# Patient Record
Sex: MALE | Race: WHITE | NOT HISPANIC OR LATINO | URBAN - METROPOLITAN AREA
[De-identification: names, ages, dates, MRNs, and addresses within clinical notes are randomized per-mention and may not be internally consistent; named-entity substitution may affect disease eponyms.]

---

## 2017-12-16 ENCOUNTER — HOME CARE/HOSPICE - HEALTHEAST (OUTPATIENT)
Dept: HOME HEALTH SERVICES | Facility: HOME HEALTH | Age: 62
End: 2017-12-16

## 2019-01-12 NOTE — EDM.PDOC
ED HPI GENERAL MEDICAL PROBLEM





- General


Chief Complaint: Chest Pain


Stated Complaint: CHEST PAINS


Time Seen by Provider: 01/12/19 01:17


Source of Information: Reports: Patient


History Limitations: Reports: No Limitations





- History of Present Illness


INITIAL COMMENTS - FREE TEXT/NARRATIVE: 





63-year-old male presents with intermittent chest pain for the past 3 weeks. It 

is not related to any activity although it does seem to be somewhat worse when 

he is lying down. No shortness of breath. The pain is substernal and lasts 

between 30 minutes to an hour and a half. At times it's very sharp but 

nonradiating. He was in to see his regular doctor 2 days ago regarding sciatic 

pain and did not mention it. Tonight he was having discomfort again so his 

girlfriend made him come in to be checked. Now that he see her it's going away, 

he does have a history of anxiety. He also has a history of reflux but is 

taking omeprazole daily.


Onset: Sudden (When pain occurs seems to arise fairly suddenly)


Associated Symptoms: Reports: Chest Pain.  Denies: Confusion, Cough, Shortness 

of Breath


  ** Chest Pain


Pain Score (Numeric/FACES): 6





- Related Data


 Allergies











Allergy/AdvReac Type Severity Reaction Status Date / Time


 


No Known Allergies Allergy   Verified 01/12/19 01:28











Home Meds: 


 Home Meds





Gabapentin 600 mg PO BID 02/23/16 [History]


Aspirin [Halfprin] 81 mg PO DAILY 01/12/19 [History]


Citalopram [Citalopram HBr] 20 mg PO DAILY 01/12/19 [History]


Cyclobenzaprine [Flexeril] 10 mg PO TID 01/12/19 [History]


FLUoxetine HCl [Fluoxetine HCl] 20 mg PO DAILY 01/12/19 [History]


Meloxicam 15 mg PO DAILY 01/12/19 [History]


Omeprazole 20 mg PO DAILY 01/12/19 [History]


QUEtiapine Fumarate [Quetiapine Fumarate] 100 mg PO BEDTIME 01/12/19 [History]


Sennosides/Docusate Sodium [Senna-Docusate Sodium Tablet] 1 each PO DAILY 01/12/ 19 [History]


methylPREDNISolone [Methylprednisolone] 1 dose PO ASDIRECTED 01/12/19 [History]


tiZANidine [Zanaflex] 4 mg PO BID 01/12/19 [History]


traMADol HCl [Tramadol HCl] 50 mg PO Q6HR PRN 01/12/19 [History]











Past Medical History


HEENT History: Reports: Hard of Hearing


Other Genitourinary History: hydrocele


Musculoskeletal History: Reports: Amputation, Arthritis, Back Pain, Chronic, 

Fracture, Neck Pain, Chronic


Hematologic History: Reports: Blood Transfusion(s)





- Past Surgical History


GI Surgical History: Reports: Hernia, Inguinal


Other Male  Surgeries/Procedures: Right orchiectomy


Neurological Surgical History: Reports: C-Spine, Lumbar Spine, Spinal Fusion





ED ROS GENERAL





- Review of Systems


Review Of Systems: See Below


Constitutional: Denies: Fever, Chills


HEENT: Reports: No Symptoms


Respiratory: Denies: Shortness of Breath, Cough


Cardiovascular: Reports: Chest Pain.  Denies: Palpitations


GI/Abdominal: Denies: Abdominal Pain, Nausea, Vomiting


: Reports: No Symptoms


Skin: Reports: No Symptoms


Neurological: Denies: Headache





ED EXAM, GENERAL





- Physical Exam


Exam: See Below


Exam Limited By: No Limitations


General Appearance: Alert, No Apparent Distress


Eye Exam: Bilateral Eye: Normal Inspection


Respiratory/Chest: No Respiratory Distress, Lungs Clear


Cardiovascular: Regular Rate, Rhythm.  No: Extra Beats


GI/Abdominal: Soft, Non-Tender


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


Neurological: Alert, Oriented


Psychiatric: Anxious


Skin Exam: Warm, Dry





EKG INTERPRETATION


Rhythm: NSR





Course





- Vital Signs


Last Recorded V/S: 


 Last Vital Signs











Temp  95.5 F   01/12/19 01:19


 


Pulse  67   01/12/19 02:00


 


Resp  15   01/12/19 02:00


 


BP  137/79   01/12/19 02:00


 


Pulse Ox  94 L  01/12/19 02:00














- Orders/Labs/Meds


Orders: 


 Active Orders 24 hr











 Category Date Time Status


 


 EKG Documentation Completion [RC] ASDIRECTED Care  01/12/19 01:26 Active


 


 Chest 2V [CR] Routine Exams  01/12/19 01:25 Taken


 


 EKG 12 Lead [EK] Routine Ther  01/12/19 01:25 Ordered











Labs: 


 Laboratory Tests











  01/12/19 01/12/19 Range/Units





  01:30 01:30 


 


WBC  6.4   (4.5-11.0)  K/uL


 


RBC  4.09 L   (4.30-5.90)  M/uL


 


Hgb  12.6   (12.0-15.0)  g/dL


 


Hct  37.4 L   (40.0-54.0)  %


 


MCV  91   (80-98)  fL


 


MCH  31   (27-31)  pg


 


MCHC  34   (32-36)  %


 


Plt Count  218   (150-400)  K/uL


 


Neut % (Auto)  86 H   (36-66)  %


 


Lymph % (Auto)  10 L   (24-44)  %


 


Mono % (Auto)  5   (2-6)  %


 


Eos % (Auto)  0 L   (2-4)  %


 


Baso % (Auto)  0   (0-1)  %


 


Sodium   142  (140-148)  mmol/L


 


Potassium   4.2  (3.6-5.2)  mmol/L


 


Chloride   106  (100-108)  mmol/L


 


Carbon Dioxide   26  (21-32)  mmol/L


 


Anion Gap   10.1  (5.0-14.0)  mmol/L


 


BUN   16  (7-18)  mg/dL


 


Creatinine   0.7 L  (0.8-1.3)  mg/dL


 


Est Cr Clr Drug Dosing   88.36  mL/min


 


Estimated GFR (MDRD)   > 60  (>60)  


 


Glucose   137 H  ()  mg/dL


 


Calcium   8.7  (8.5-10.1)  mg/dL


 


Troponin I   < 0.017  (0.000-0.056)  ng/mL














- Re-Assessments/Exams


Free Text/Narrative Re-Assessment/Exam: 





01/12/19 01:32


EKG was completely normal. A two-view chest x-ray, CBC, CMP and troponin were 

obtained. Patient's pain had resolved before treatment could be given.


01/12/19 02:10


Two-view chest x-ray was normal, CBC CMP and troponin were all reassuring, 

troponin was 0. No reason for hospitalization, recheck with primary provider in 

the next 1-2 weeks to discuss stress tests and it would also be helpful to have 

some liquid antacid available if pain recurs.





Departure





- Departure


Time of Disposition: 02:39


Disposition: Home, Self-Care 01


Condition: Good


Clinical Impression: 


 Atypical chest pain





Acid reflux


Qualifiers:


 Esophagitis presence: esophagitis presence not specified Qualified Code(s): 

K21.9 - Gastro-esophageal reflux disease without esophagitis








- Discharge Information


Instructions:  Nonspecific Chest Pain, Easy-to-Read


Referrals: 


PCP,None [Primary Care Provider] - 


Forms:  ED Department Discharge


Care Plan Goals: 


Continue your current medications, and have some liquid antacid available to 

drive pain recurs. If pain persists discuss a stress test with your primary 

provider or return to the emergency room.





- My Orders


Last 24 Hours: 


My Active Orders





01/12/19 01:25


Chest 2V [CR] Routine 


EKG 12 Lead [EK] Routine 





01/12/19 01:26


EKG Documentation Completion [RC] ASDIRECTED 














- Assessment/Plan


Last 24 Hours: 


My Active Orders





01/12/19 01:25


Chest 2V [CR] Routine 


EKG 12 Lead [EK] Routine 





01/12/19 01:26


EKG Documentation Completion [RC] ASDIRECTED

## 2019-01-14 NOTE — CR
CHEST: 2 view

 

CLINICAL HISTORY:Dyspnea

 

COMPARISON:None

 

FINDINGS:  The heart size, pulmonary vascular and hilar structures are normal.

No infiltrate effusion or pneumothorax is seen.

 

IMPRESSION: No acute cardiopulmonary process.

## 2021-01-24 ENCOUNTER — HOSPITAL ENCOUNTER (EMERGENCY)
Dept: HOSPITAL 11 - JP.ED | Age: 66
Discharge: HOME | End: 2021-01-24
Payer: MEDICARE

## 2021-01-24 VITALS — DIASTOLIC BLOOD PRESSURE: 91 MMHG | HEART RATE: 65 BPM | SYSTOLIC BLOOD PRESSURE: 145 MMHG

## 2021-01-24 DIAGNOSIS — M62.838: ICD-10-CM

## 2021-01-24 DIAGNOSIS — Z79.899: ICD-10-CM

## 2021-01-24 DIAGNOSIS — Z72.0: ICD-10-CM

## 2021-01-24 DIAGNOSIS — M19.90: ICD-10-CM

## 2021-01-24 DIAGNOSIS — Z98.890: ICD-10-CM

## 2021-01-24 DIAGNOSIS — Z79.82: ICD-10-CM

## 2021-01-24 DIAGNOSIS — M25.511: Primary | ICD-10-CM

## 2021-01-24 PROCEDURE — 99283 EMERGENCY DEPT VISIT LOW MDM: CPT

## 2021-01-24 NOTE — EDM.PDOC
ED HPI GENERAL MEDICAL PROBLEM





- General


Chief Complaint: Upper Extremity Injury/Pain


Stated Complaint: RIGHT SHOULDER PAIN


Time Seen by Provider: 01/24/21 07:30


Source of Information: Reports: Patient, Family, RN


History Limitations: Reports: No Limitations





- History of Present Illness


INITIAL COMMENTS - FREE TEXT/NARRATIVE: 





65-year-old male with a history of over 40 skeletal surgeries including a 

prosthesis in his left shoulder and his left lower leg comes now because of 

severe muscle spasm pain in the rhomboids and trapezius area on the right 

shoulder that kept him awake last night.  He has been seen frequently by his 

private doctor and apparently few days ago got injections in the area that got a

little bit numb for a while but has not relieved the muscle spasm.  Problem is 

not in the shoulder tip itself and he has had apparently MRIs.  The pain is more

just lateral to the spine where he has had prior surgeries as well.  Some days 

he is fine and has no pain other days he has the pain like a charley horse 

effect in the leg.  The Robaxin and Norco have had no effect.


He has taken Valium in the past when he had muscle spasm problems with his leg 

and found it to be effective


  ** Right Shoulder


Pain Score (Numeric/FACES): 10





- Related Data


                                    Allergies











Allergy/AdvReac Type Severity Reaction Status Date / Time


 


No Known Allergies Allergy   Verified 01/24/21 07:07











Home Meds: 


                                    Home Meds





Gabapentin 600 mg PO BID 02/23/16 [History]


Aspirin [Halfprin] 81 mg PO DAILY 01/12/19 [History]


FLUoxetine HCl [Fluoxetine HCl] 20 mg PO DAILY 01/12/19 [History]


Meloxicam 15 mg PO DAILY 01/12/19 [History]


Omeprazole 20 mg PO DAILY 01/12/19 [History]


Hydrocodone/Acetaminophen [Hydrocodon-Acetaminophen 5-325] 1 tab PO ASDIRECTED 

01/24/21 [History]


Mv-Mn/Iron/Folic Acid/Herb 190 [Vitamin D3 Complete Caplet] 1 tab PO DAILY 

01/24/21 [History]


methocarbamoL [Methocarbamol] 500 mg PO ASDIRECTED 01/24/21 [History]











Past Medical History


HEENT History: Reports: Hard of Hearing, Impaired Vision


Gastrointestinal History: Reports: None


Genitourinary History: Reports: Other (See Below)


Other Genitourinary History: hydrocele


Musculoskeletal History: Reports: Amputation, Arthritis, Back Pain, Chronic, 

Fracture, Neck Pain, Chronic


Other Musculoskeletal History: Disc degenerative disease neck


Psychiatric History: Reports: Addiction, Depression


Hematologic History: Reports: Blood Transfusion(s)





- Infectious Disease History


Infectious Disease History: Reports: Chicken Pox





- Past Surgical History


Head Surgeries/Procedures: Reports: None


HEENT Surgical History: Reports: Adenoidectomy, Tonsillectomy, Other (See Below)


Other HEENT Surgeries/Procedures: cleft palate


GI Surgical History: Reports: Hernia, Inguinal


Male  Surgical History: Reports: Other (See Below)


Other Male  Surgeries/Procedures: Right orchiectomy


Neurological Surgical History: Reports: C-Spine, Lumbar Spine, Spinal Fusion


Musculoskeletal Surgical History: Reports: Amputation, Other (See Below)


Other Musculoskeletal Surgeries/Procedures:: left BKA


Dermatological Surgical History: Reports: None





Social & Family History





- Tobacco Use


Tobacco Use Status *Q: Current Every Day Tobacco User


Years of Tobacco use: 30


Packs/Tins Daily: 0.5


Used Tobacco, but Quit: No


Second Hand Smoke Exposure: No





- Caffeine Use


Caffeine Use: Reports: Coffee





- Alcohol Use


Days Per Week of Alcohol Use: 5


Number of Drinks Per Day: 2


Total Drinks Per Week: 10





- Recreational Drug Use


Recreational Drug Use: No





Review of Systems





- Review of Systems


Review Of Systems: See Below


Constitutional: Reports: No Symptoms, Other (10 or more systems reviewed and he 

appears to be negative and most other than the musculoskeletal as the major 

problem)





ED EXAM, GENERAL





- Physical Exam


Exam: See Below


Free Text/Narrative:: 





65-year-old male sitting in the chair accompanied by his wife.  In general he is

 a relatively thin male who appears moderate distress because of the pain in his

 shoulder.


HEENT shows eyes ears nose and throat appear to be okay


Neck has limited range of motion and he has a scar on his upper thoracic 

cervical spine area from prior surgery


Chest clear regular rate and rhythm


Extremities he has a prosthesis left lower leg.  Range of motion function of the

 shoulder appears to be okay


Musculoskeletal he has tenderness in the rhomboids on the right side of the 

upper spine and back and into the trapezius which suggest some muscle spasm that

 he does not have on the left side.


Neurologic without focal deficit.





Exam Limited By: No Limitations


General Appearance: Alert, Anxious, Moderate Distress





Course





- Vital Signs


Text/Narrative:: 





I discussed with him in detail the options including a stronger type of pain 

medicine such as Dilaudid or to go to a better muscle relaxer such as 10 mg of 

Valium supplemented with his 400 of Motrin and 500 of Tylenol.  He opts to go 

for that as I cannot really be both the Valium and Dilaudid.  He will then 

follow-up with his physician


Last Recorded V/S: 





                                Last Vital Signs











Temp  36.3 C   01/24/21 07:13


 


Pulse  65   01/24/21 07:13


 


Resp  17   01/24/21 07:13


 


BP  145/91 H  01/24/21 07:13


 


Pulse Ox  96   01/24/21 07:13














- Orders/Labs/Meds


Meds: 





Medications














Discontinued Medications














Generic Name Dose Route Start Last Admin





  Trade Name Faby  PRN Reason Stop Dose Admin


 


Diazepam  10 mg  01/24/21 07:49 





  Valium.  PO  01/24/21 07:50 





  ONETIME ONE  














Departure





- Departure


Time of Disposition: 08:00


Disposition: Home, Self-Care 01


Condition: Good


Clinical Impression: 


 Muscle spasm, Shoulder pain








- Discharge Information


Instructions:  Muscle Cramps and Spasms, Easy-to-Read


Referrals: 


Kathleen Cruz MD [Primary Care Provider] - 


Forms:  ED Department Discharge


Additional Instructions: 


400 of Motrin and 500 of Tylenol every 4-6 hours for pain.


Valium 10 mg 1/2 to 1 tablet every 6-8 hours for muscle spasm.  Follow-up with 

your physician





Sepsis Event Note (ED)





- Evaluation


Sepsis Screening Result: No Definite Risk





- Focused Exam


Vital Signs: 





                                   Vital Signs











  Temp Pulse Resp BP Pulse Ox


 


 01/24/21 07:13  36.3 C  65  17  145/91 H  96


 


 01/24/21 07:07  36.3 C  65  17  145/91 H  96

## 2021-01-25 ENCOUNTER — HOSPITAL ENCOUNTER (EMERGENCY)
Dept: HOSPITAL 11 - JP.ED | Age: 66
Discharge: HOME | End: 2021-01-25
Payer: MEDICARE

## 2021-01-25 VITALS — SYSTOLIC BLOOD PRESSURE: 151 MMHG | DIASTOLIC BLOOD PRESSURE: 99 MMHG | HEART RATE: 77 BPM

## 2021-01-25 DIAGNOSIS — M62.838: ICD-10-CM

## 2021-01-25 DIAGNOSIS — Z79.899: ICD-10-CM

## 2021-01-25 DIAGNOSIS — Z72.0: ICD-10-CM

## 2021-01-25 DIAGNOSIS — M19.90: ICD-10-CM

## 2021-01-25 DIAGNOSIS — M25.511: Primary | ICD-10-CM

## 2021-01-25 DIAGNOSIS — Z79.82: ICD-10-CM

## 2021-01-25 DIAGNOSIS — G89.29: ICD-10-CM

## 2021-01-25 PROCEDURE — 99283 EMERGENCY DEPT VISIT LOW MDM: CPT

## 2021-01-25 PROCEDURE — 96372 THER/PROPH/DIAG INJ SC/IM: CPT

## 2021-01-25 NOTE — EDM.PDOC
ED HPI GENERAL MEDICAL PROBLEM





- General


Chief Complaint: Upper Extremity Injury/Pain


Stated Complaint: PAIN R SHOULDER


Time Seen by Provider: 01/25/21 18:16


Source of Information: Reports: Patient, Family, Old Records, RN Notes Reviewed


History Limitations: Reports: No Limitations





- History of Present Illness


INITIAL COMMENTS - FREE TEXT/NARRATIVE: 





65-year-old gentleman presents emergency department with a complaint of right 

shoulder pain, he states he has had chronic shoulder pain for some time however 

it has gotten significantly worse over the last week he has visited with his 

primary care he has visited with orthopedic surgeon who does not want to do 

surgery on him did try an injection on Friday to see if it would buy him some 

time.  He stated initially it felt good but now it is progressively gotten 

worse.  He is using hydrocodone has used both Flexeril and Valium for muscle 

relaxant without any relief.


  ** Right Middle Shoulder


Pain Score (Numeric/FACES): 10





- Related Data


                                    Allergies











Allergy/AdvReac Type Severity Reaction Status Date / Time


 


No Known Allergies Allergy   Verified 01/24/21 07:07











Home Meds: 


                                    Home Meds





Gabapentin 600 mg PO BID 02/23/16 [History]


Aspirin [Halfprin] 81 mg PO DAILY 01/12/19 [History]


FLUoxetine HCl [Fluoxetine HCl] 20 mg PO DAILY 01/12/19 [History]


Meloxicam 15 mg PO DAILY 01/12/19 [History]


Omeprazole 20 mg PO DAILY 01/12/19 [History]


Hydrocodone/Acetaminophen [Hydrocodon-Acetaminophen 5-325] 1 tab PO ASDIRECTED 

01/24/21 [History]


Mv-Mn/Iron/Folic Acid/Herb 190 [Vitamin D3 Complete Caplet] 1 tab PO DAILY 

01/24/21 [History]


methocarbamoL [Methocarbamol] 500 mg PO ASDIRECTED 01/24/21 [History]


Acetaminophen [Tylenol] 1,000 mg PO QID 01/25/21 [History]


Ibuprofen 400 mg PO QID 01/25/21 [History]


diazePAM [Valium] 10 mg PO QID 01/25/21 [History]











Past Medical History


HEENT History: Reports: Hard of Hearing, Impaired Vision


Genitourinary History: Reports: Other (See Below)


Other Genitourinary History: hydrocele


Musculoskeletal History: Reports: Amputation, Arthritis, Back Pain, Chronic, 

Fracture, Neck Pain, Chronic


Other Musculoskeletal History: Disc degenerative disease neck


Psychiatric History: Reports: Addiction, Depression


Hematologic History: Reports: Blood Transfusion(s)





- Infectious Disease History


Infectious Disease History: Reports: Chicken Pox





- Past Surgical History


Head Surgeries/Procedures: Reports: None


HEENT Surgical History: Reports: Adenoidectomy, Tonsillectomy, Other (See Below)


Other HEENT Surgeries/Procedures: cleft palate


GI Surgical History: Reports: Hernia, Inguinal


Male  Surgical History: Reports: Other (See Below)


Other Male  Surgeries/Procedures: Right orchiectomy


Neurological Surgical History: Reports: C-Spine, Lumbar Spine, Spinal Fusion


Musculoskeletal Surgical History: Reports: Amputation, Other (See Below)


Other Musculoskeletal Surgeries/Procedures:: left BKA


Dermatological Surgical History: Reports: None





Social & Family History





- Tobacco Use


Tobacco Use Status *Q: Current Every Day Tobacco User


Years of Tobacco use: 30


Packs/Tins Daily: 0.5





- Caffeine Use


Caffeine Use: Reports: Coffee





- Recreational Drug Use


Recreational Drug Use: No





Review of Systems





- Review of Systems


Review Of Systems: See Below


Constitutional: Reports: No Symptoms


Mouth/Throat: Reports: No Symptoms


Respiratory: Reports: No Symptoms


Cardiovascular: Reports: No Symptoms


GI/Abdominal: Reports: No Symptoms


Musculoskeletal: Reports: Shoulder Pain


Neurological: Reports: No Symptoms





ED EXAM, GENERAL





- Physical Exam


Exam: See Below


Free Text/Narrative:: 





Examination of the right shoulder he has full range of motion of shoulder I 

cannot appreciate any point tenderness in the shoulder he is tender along the 

rhomboids and scapular region on the right side radial pulses +2


Exam Limited By: No Limitations


General Appearance: Alert, WD/WN, No Apparent Distress


Respiratory/Chest: No Respiratory Distress





Course





- Vital Signs


Last Recorded V/S: 


                                Last Vital Signs











Temp  98.0 F   01/25/21 18:15


 


Pulse  77   01/25/21 19:18


 


Resp  17   01/25/21 19:18


 


BP  151/99 H  01/25/21 19:18


 


Pulse Ox  95   01/25/21 19:18














- Orders/Labs/Meds


Meds: 


Medications














Discontinued Medications














Generic Name Dose Route Start Last Admin





  Trade Name Freq  PRN Reason Stop Dose Admin


 


Baclofen  10 mg  01/25/21 18:52  01/25/21 18:56





  Lioresal  PO  01/25/21 18:53  10 mg





  ONETIME ONE   Administration


 


Hydromorphone HCl  1 mg  01/25/21 18:52  01/25/21 18:56





  Dilaudid  IM  01/25/21 18:53  1 mg





  ONETIME ONE   Administration














Departure





- Departure


Time of Disposition: 19:41


Disposition: Home, Self-Care 01


Condition: Fair


Clinical Impression: 


 Muscle spasm





Shoulder pain


Qualifiers:


 Chronicity: chronic Laterality: right Qualified Code(s): M25.511 - Pain in 

right shoulder; G89.29 - Other chronic pain








- Discharge Information


Instructions:  Shoulder Pain


Referrals: 


Kathleen Cruz MD [Primary Care Provider] - 


Forms:  ED Department Discharge


Additional Instructions: 


Please contact your primary care in the morning as well as orthopedics for 

further evaluation and treatment call return to the emergency department 

worsening of symptoms





Sepsis Event Note (ED)





- Evaluation


Sepsis Screening Result: No Definite Risk





- Focused Exam


Vital Signs: 


                                   Vital Signs











  Temp Pulse Resp BP Pulse Ox


 


 01/25/21 19:18   77  17  151/99 H  95


 


 01/25/21 18:15  98.0 F  83  16  163/104 H  97


 


 01/25/21 18:11  98.0 F  83  16  163/104 H  97














- Assessment/Plan


Plan: 





Assessment





Acuity = chronic





Site and laterality = right shoulder pain





Etiology  = unknown





Manifestations = none





Location of injury =  Home





Lab values = none





Plan


Good relief combination hydromorphone and baclofen, prescription written for 

hydromorphone 2 mg 1 tab p.o. 3 times daily as needed total #10 as well as 

baclofen 10 mg 1 tab p.o. 3 times daily as needed total #10 he will contact both

orthopedics and his primary care tomorrow for further follow-up

















 This note was dictated using dragon voice recognition software please call with

any questions on syntax or grammar.

## 2021-05-15 NOTE — EDM.PDOC
ED HPI GENERAL MEDICAL PROBLEM





- General


Chief Complaint: Wound Recheck


Stated Complaint: NECK PAIN


Time Seen by Provider: 05/15/21 21:35


Source of Information: Reports: Patient, Family


History Limitations: Reports: No Limitations





- History of Present Illness


INITIAL COMMENTS - FREE TEXT/NARRATIVE: 





65-year-old male who had cervical spine surgery 19 days ago, it sounds like he h

ad a fusion of C2-C7.  He had been doing well, was off his pain medication but 

28 hours ago he was pulling on his prosthetic leg when he felt a pop sensation 

in his right neck.  He later reached out and pulled something with his right arm

and again felt the same type of sensation.  He has had a marked increase in pain

in his neck since that time, a few paresthesias or sharp pains in his left arm n

ear the antecubital area but no other neurologic deficits or symptoms 

peripherally.  He is just getting concerned that he may have done something to 

the hardware.  He arrives wearing his neck immobilizer, appears stable.  He took

one of his oxycodones at 7 PM, it is now 9:30.


Onset: Sudden


Duration: Hour(s): (First episode was 28 hours ago)


Location: Reports: Neck (Right side)


Associated Symptoms: Reports: Other (Some burning paresthesia sensations in the 

left arm)


  ** Right Neck


Pain Score (Numeric/FACES): 8





- Related Data


                                    Allergies











Allergy/AdvReac Type Severity Reaction Status Date / Time


 


No Known Allergies Allergy   Verified 05/15/21 21:18











Home Meds: 


                                    Home Meds





Gabapentin 600 mg PO BID 02/23/16 [History]


Aspirin [Halfprin] 81 mg PO DAILY 01/12/19 [History]


FLUoxetine HCl [Fluoxetine HCl] 20 mg PO DAILY 01/12/19 [History]


Meloxicam 15 mg PO DAILY 01/12/19 [History]


Omeprazole 20 mg PO DAILY 01/12/19 [History]


methocarbamoL [Methocarbamol] 750 mg PO ASDIRECTED 01/24/21 [History]


Acetaminophen [Tylenol] 1,000 mg PO QID 01/25/21 [History]


LORazepam [Ativan] 1 mg PO ASDIRECTED 05/15/21 [History]


Sennosides [Maribell-Moriah] 8.6 mg PO DAILY 05/15/21 [History]


oxyCODONE 5 mg PO ASDIRECTED PRN 05/15/21 [History]











Past Medical History


HEENT History: Reports: Hard of Hearing, Impaired Vision


Gastrointestinal History: Reports: None


Genitourinary History: Reports: Other (See Below)


Other Genitourinary History: hydrocele


Musculoskeletal History: Reports: Amputation, Arthritis, Back Pain, Chronic, 

Fracture, Neck Pain, Chronic


Other Musculoskeletal History: Disc degenerative disease neck


Psychiatric History: Reports: Addiction, Depression


Hematologic History: Reports: Blood Transfusion(s)





- Infectious Disease History


Infectious Disease History: Reports: Chicken Pox





- Past Surgical History


Head Surgeries/Procedures: Reports: None


HEENT Surgical History: Reports: Adenoidectomy, Tonsillectomy, Other (See Below)


Other HEENT Surgeries/Procedures: cleft palate


GI Surgical History: Reports: Hernia, Inguinal


Male  Surgical History: Reports: Other (See Below)


Other Male  Surgeries/Procedures: Right orchiectomy


Neurological Surgical History: Reports: C-Spine, Lumbar Spine, Spinal Fusion


Musculoskeletal Surgical History: Reports: Amputation, Other (See Below)


Other Musculoskeletal Surgeries/Procedures:: left BKA.  neck cervical spine 

surgery


Dermatological Surgical History: Reports: None





Social & Family History





- Tobacco Use


Tobacco Use Status *Q: Former Tobacco User


Used Tobacco, but Quit: Yes


Month/Year Tobacco Last Used: 03/2021





- Caffeine Use


Caffeine Use: Reports: Coffee


Caffeine Use Comment: 1/2 a pot per day





- Recreational Drug Use


Recreational Drug Use: No





ED ROS GENERAL





- Review of Systems


Review Of Systems: See Below


Constitutional: Denies: Fever, Chills


HEENT: Denies: Throat Pain, Vision Change


Respiratory: Denies: Shortness of Breath


GI/Abdominal: Denies: Nausea, Vomiting


: Reports: No Symptoms


Musculoskeletal: Reports: Neck Pain, Back Pain


Skin: Reports: Other (No change in the incisions, dry and clean)


Neurological: Denies: Headache





ED EXAM, UPPER BACK/NECK PAIN





- Physical Exam


Exam: See Below


Text/Narrative:: 





Patient initially had a cervical spine immobilizer on which was removed.  

Incisions look excellent.  On palpation he has some soreness just to the right 

of the cervical bodies at roughly C6-C7.  He appears to be comfortable with 

gentle range of motion of the neck.


Exam Limited By: No Limitations


General Appearance: Alert, No Apparent Distress


Eye Exam: Bilateral Eye: Normal Inspection


Head Exam: Atraumatic


Neck Exam: Painful Range of Motion (Patient can rotate the neck, flexion and 

extension without significant pain), Paraspinous Muscle Tender (Some palpation 

tenderness just to the right of the C5-C7 area of the neck)


Extremities: Other (Prosthetic leg on the left side)


Neurologic: No Motor/Sensory Deficits, Normal Mood/Affect, Oriented x 3


Psychiatric: Normal Affect, Normal Mood


Skin Exam: Other (Incisions are dry, slight erythema but no warmth, healing 

looks appropriate)





Course





- Vital Signs


Last Recorded V/S: 


                                Last Vital Signs











Temp  97.5 F   05/15/21 21:25


 


Pulse  70   05/15/21 23:01


 


Resp  16   05/15/21 23:01


 


BP  130/86   05/15/21 23:01


 


Pulse Ox  96   05/15/21 21:25














- Orders/Labs/Meds


Meds: 


Medications














Discontinued Medications














Generic Name Dose Route Start Last Admin





  Trade Name Ckq  PRN Reason Stop Dose Admin


 


Hydromorphone HCl  1 mg  05/15/21 21:36  05/15/21 21:41





  Hydromorphone 1 Mg/Ml Syringe  IM  05/15/21 21:37  1 mg





  ONETIME ONE   Administration














- Re-Assessments/Exams


Free Text/Narrative Re-Assessment/Exam: 





05/15/21 22:07


Patient was given 1 mg of IM Dilaudid, and a cervical spine x-ray was obtained.


05/15/21 22:48


C-spine x-ray looks good, read report to still pending.  Patient felt much 

better after the reassurance of the x-ray and the IM Dilaudid.  Monday he is 

going to return to get the images sent down to the spine center and discussed 

with them how he is feeling.  He can return anytime if worsening.


05/16/21 02:00





FINDINGS/IMPRESSION:


AP and lateral views of the cervical spine.





Status post anterior cervical fusion from C3 to C7, as before, with unchanged 

bone grafts within the C3-4, C4-5, C5-6, and C6-7 disc spaces and a metallic 

plate affixed by screws to the C5, C6, and C7 vertebral bodies. Interval 

revision of previously seen bilateral posterior stabilization rods, now 

extending from C3 inferiorly to the upper thoracic spine. Surgical hardware 

appears intact. No fractures are identified. Osseous alignment is within normal 

limits. Prevertebral soft tissues are unremarkable.








Departure





- Departure


Time of Disposition: 23:00


Disposition: Home, Self-Care 01


Clinical Impression: 


 Acute neck pain








- Discharge Information


Instructions:  Pain Medicine Instructions, Easy-to-Read


Referrals: 


Kathleen Cruz MD [Primary Care Provider] - 


Forms:  ED Department Discharge


Care Plan Goals: 


Take full doses of oxycodone, Tylenol and ibuprofen over the next few days to 

decrease your pain and return anytime if peripheral nerve deficits occur such as

weakness in an arm or leg, or pain is uncontrollable.  We will contact you if 

anything is concerning about the neck x-ray.





Sepsis Event Note (ED)





- Evaluation


Sepsis Screening Result: No Definite Risk





- Focused Exam


Vital Signs: 


                                   Vital Signs











  Temp Pulse Resp BP Pulse Ox


 


 05/15/21 23:01   70  16  130/86 


 


 05/15/21 21:25  97.5 F  89  16  140/92 H  96

## 2021-05-15 NOTE — CRLCR
INDICATION:



 Three weeks postop. Re-injury. Check hardware. 



COMPARISON:



01/26/2021.



FINDINGS/IMPRESSION:



AP and lateral views of the cervical spine.



Status post anterior cervical fusion from C3 to C7, as before, with 

unchanged bone grafts within the C3-4, C4-5, C5-6, and C6-7 disc spaces and 

a metallic plate affixed by screws to the C5, C6, and C7 vertebral bodies. 

Interval revision of previously seen bilateral posterior stabilization 

rods, now extending from C3 inferiorly to the upper thoracic spine. 

Surgical hardware appears intact. No fractures are identified. Osseous 

alignment is within normal limits. Prevertebral soft tissues are 

unremarkable.



Dictated by Emery De Leon MD @ 5/15/2021 11:37:40 PM



Dictated by: Emery De Leon MD @ 05/15/2021 23:37:53



(Electronically Signed)

## 2021-05-19 NOTE — EDM.PDOC
ED HPI GENERAL MEDICAL PROBLEM





- General


Chief Complaint: Wound Recheck


Stated Complaint: OPEN WOUND


Time Seen by Provider: 05/19/21 21:19


Source of Information: Reports: Patient


History Limitations: Reports: No Limitations





- History of Present Illness


INITIAL COMMENTS - FREE TEXT/NARRATIVE: 


Amador is a 65-year-old male presenting to the ED for evaluation of open wound 

over his thoracic spine.  Patient recently underwent a cervical spine fusion 

from C3-C7 which was performed by Dr. Arroyo with [facility] at [facility] 3 weeks ago.  The patient was seen here on 5/14/2021 for increased 

neck pain and feeling a pop when he was trying to take off his prosthetic leg.  

His x-rays of the cervical spine and work-up were unremarkable at the time, 

however, today he was laying in bed when it all of a sudden felt wet and he sat 

up.  His wife said that there was red fluid on the bed and when she looked under

the dressing she saw the area was wide open.  She became concerned and brought 

him in for evaluation.  She has not contacted [facility].  The patient does

not have any fever or chills.  He has no paresthesias, numbness, or weakness.  

His fusion was for cervical stenosis.  He does have a history of severe 

degenerative disc disease.





  ** Neck


Pain Score (Numeric/FACES): 8





- Related Data


                                    Allergies











Allergy/AdvReac Type Severity Reaction Status Date / Time


 


No Known Allergies Allergy   Verified 05/15/21 21:18











Home Meds: 


                                    Home Meds





Gabapentin 600 mg PO BID 02/23/16 [History]


Aspirin [Halfprin] 81 mg PO DAILY 01/12/19 [History]


FLUoxetine HCl [Fluoxetine HCl] 20 mg PO DAILY 01/12/19 [History]


Meloxicam 15 mg PO DAILY 01/12/19 [History]


Omeprazole 20 mg PO DAILY 01/12/19 [History]


methocarbamoL [Methocarbamol] 750 mg PO ASDIRECTED 01/24/21 [History]


Acetaminophen [Tylenol] 1,000 mg PO QID 01/25/21 [History]


LORazepam [Ativan] 1 mg PO ASDIRECTED 05/15/21 [History]


Sennosides [Maribell-Moriah] 8.6 mg PO DAILY 05/15/21 [History]


oxyCODONE 5 mg PO ASDIRECTED PRN 05/15/21 [History]











Past Medical History


HEENT History: Reports: Hard of Hearing, Impaired Vision


Gastrointestinal History: Reports: None


Genitourinary History: Reports: Other (See Below)


Other Genitourinary History: hydrocele


Musculoskeletal History: Reports: Amputation, Arthritis, Back Pain, Chronic, 

Fracture, Neck Pain, Chronic


Other Musculoskeletal History: Disc degenerative disease neck


Psychiatric History: Reports: Addiction, Depression


Hematologic History: Reports: Blood Transfusion(s)





- Infectious Disease History


Infectious Disease History: Reports: Chicken Pox





- Past Surgical History


Head Surgeries/Procedures: Reports: None


HEENT Surgical History: Reports: Adenoidectomy, Tonsillectomy, Other (See Below)


Other HEENT Surgeries/Procedures: cleft palate


GI Surgical History: Reports: Hernia, Inguinal


Male  Surgical History: Reports: Other (See Below)


Other Male  Surgeries/Procedures: Right orchiectomy


Neurological Surgical History: Reports: C-Spine, Lumbar Spine, Spinal Fusion


Musculoskeletal Surgical History: Reports: Amputation, Other (See Below)


Other Musculoskeletal Surgeries/Procedures:: left BKA.  neck cervical spine 

surgery


Dermatological Surgical History: Reports: None





Social & Family History





- Caffeine Use


Caffeine Use: Reports: Coffee


Caffeine Use Comment: 1/2 a pot per day





ED ROS GENERAL





- Review of Systems


Review Of Systems: See Below


Constitutional: Reports: No Symptoms


HEENT: Reports: No Symptoms


Respiratory: Reports: No Symptoms


Cardiovascular: Reports: No Symptoms


Endocrine: Reports: No Symptoms


GI/Abdominal: Reports: No Symptoms


: Reports: No Symptoms


Musculoskeletal: Reports: Neck Pain


Skin: Reports: Wound (Wound dehiscence of the thoracic spine from recent 

cervical spine fusion.)


Neurological: Reports: No Symptoms


Psychiatric: Reports: No Symptoms


Hematologic/Lymphatic: Reports: No Symptoms


Immunologic: Reports: No Symptoms





ED EXAM, GENERAL





- Physical Exam


Exam: See Below


Exam Limited By: No Limitations


General Appearance: Alert, Anxious, Mild Distress


Neck: Supple, Non-Tender, Full Range of Motion, Other (Patient arrived in a 

cervical immobilizer which was removed for examination.  He has full range of 

motion which is predominantly nontender.  He does have a very bloody dressing 

over the upper thoracic spine.)


Respiratory/Chest: No Respiratory Distress


Cardiovascular: Normal Peripheral Pulses, Regular Rate, Rhythm


Back Exam: Other (Dehiscence of the wound in the midline posterior from roughly 

T3-T6 during 6 cm x 3 cm with clot.  I am able to view the posterior pedicles of

 the spine.  It appears the muscles have been pulled away from the spine.)


Extremities: Other (Left prosthetic leg (below-knee))


Neurological: Alert, Oriented, Normal Cognition, No Motor/Sensory Deficits


Skin Exam: Wound/Incision (Wound dehiscence in the upper thorax posteriorly 

measuring 6 cm x 3 cm.)





Course





- Vital Signs


Last Recorded V/S: 


                                Last Vital Signs











Temp  36.7 C   05/19/21 22:40


 


Pulse  101 H  05/19/21 23:27


 


Resp  16   05/19/21 23:27


 


BP  123/96 H  05/19/21 23:27


 


Pulse Ox  93 L  05/19/21 23:27














- Orders/Labs/Meds


Orders: 


                               Active Orders 24 hr











 Category Date Time Status


 


 Sodium Chloride 0.9% [Saline Flush] Med  05/19/21 22:08 Active





 10 ml FLUSH ASDIRECTED PRN   


 


 ceFAZolin [Ancef] 1 gm Med  05/19/21 23:28 Active





 Sodium Chloride 0.9% [Normal Saline] 50 ml   





 IV ONETIME   


 


 Saline Lock Insert [OM.PC] Routine Oth  05/19/21 22:08 Ordered








                                Medication Orders





Cefazolin Sodium 1 gm/ Sodium (Chloride)  50 mls @ 100 mls/hr IV ONETIME ONE


   Stop: 05/19/21 23:57


Sodium Chloride (Sodium Chloride 0.9% 10 Ml Syringe)  10 ml FLUSH ASDIRECTED PRN


   PRN Reason: Keep Vein Open


   Last Admin: 05/19/21 22:51  Dose: 10 ml


   Documented by: NINO








Meds: 


Medications











Generic Name Dose Route Start Last Admin





  Trade Name Freq  PRN Reason Stop Dose Admin


 


Cefazolin Sodium 1 gm/ Sodium  50 mls @ 100 mls/hr  05/19/21 23:28 





  Chloride  IV  05/19/21 23:57 





  ONETIME ONE  


 


Sodium Chloride  10 ml  05/19/21 22:08  05/19/21 22:51





  Sodium Chloride 0.9% 10 Ml Syringe  FLUSH   10 ml





  ASDIRECTED PRN   Administration





  Keep Vein Open  














Discontinued Medications














Generic Name Dose Route Start Last Admin





  Trade Name Freq  PRN Reason Stop Dose Admin


 


Cefazolin Sodium  Confirm  05/19/21 23:25 





  Cefazolin 1 Gm Vial  Administered  05/19/21 23:26 





  Dose  





  1 gm  





  .ROUTE  





  .STK-MED ONE  


 


Hydromorphone HCl  1 mg  05/19/21 21:22  05/19/21 21:29





  Hydromorphone 1 Mg/Ml Syringe  IM  05/19/21 21:23  1 mg





  ONETIME ONE   Administration


 


Piperacillin Sod/Tazobactam  100 mls @ 100 mls/hr  05/19/21 22:08  05/19/21 

22:39





  Sod 4.5 gm/ Sodium Chloride  IV  05/19/21 23:07  100 mls/hr





  ONETIME ONE   Administration


 


Cefazolin Sodium/Dextrose 1 gm  50 mls @ 100 mls/hr  05/19/21 23:14 





  / Premix  IV  05/19/21 23:43 





  ONETIME ONE  


 


Sodium Chloride  Confirm  05/19/21 23:25 





  Normal Saline  Administered  05/19/21 23:26 





  Dose  





  50 mls @ as directed  





  .ROUTE  





  .STK-MED ONE  














- Re-Assessments/Exams


Free Text/Narrative Re-Assessment/Exam: 





05/19/21 22:05 I discussed the case with Dr. Celaya, fellow in neurosurgery at 

[facility] who recommended that we put moist Curlex packing in the 

wound and over that place abdominal pad dressings.  He recommended starting IV 

antibiotics which we had with Zosyn.  He would like to talk with Dr. Castillo and 

Dr. Arroyo regarding the time sensitive closure of this wound which is open to 

his spine.  1 call notified us that there were no beds available at [facility] at this time.  Dr. Celaya suggested perhaps trying [place] which would 

be much closer to do the closure which would likely require either general 

surgeon doing a washout and closure or plastic surgery but felt that 

neurosurgery really did need to be involved as it was just a wound closure.





05/19/21 22:33 after waiting for nearly 1/2 an hour, a family heard back from 

[facility] concerning the transfer of the patient.  They are trying to get a 

hold of their neurosurgeon, Dr. Faust, but he is not answering his phone and may 

be in the operating room.  They will call back once he is available to discuss 

whether he is comfortable with taking this transfer.





05/19/21 23:10 after waiting over a half an hour we elected to call [facility] and I discussed the case with Dr. Lucia from neurosurgery who felt 

strongly that the patient should go back to the primary surgeon that operated on

 him and would not accept him in transfer to [facility].  He was concerned 

that the patient may have a meningocele or leakage of CSF.





05/19/21 23:20 I received a phone call back from Nunes Reginald, Dr. Faust who 

like Dr. Lucia also felt that the patient should go back to his primary surgeon 

and also refused to take the patient.





05/19/21 23:25 I discussed the case again with Dr. Celaya who accepts the patient 

in transfer.  1 call will have Dr. Daniels call me to arrange for direct 

admission to [facility] which now has a bed available for the patient. 

 Will arrange for EMS to transfer the patient once I talk with Dr. Daniels.  

This was discussed with the patient.  Dr. Celaya would like me to add Ancef 1 g IV 

to the Zosyn for extended coverage of this open wound.








Departure





- Departure


Time of Disposition: 23:37


Disposition: DC/Tfer to [facility] 02


Clinical Impression: 


Surgical wound dehiscence


Qualifiers:


 Encounter type: initial encounter Qualified Code(s): T81.31XA - Disruption of 

external operation (surgical) wound, not elsewhere classified, initial encounter








- Discharge Information


Referrals: 


Kathleen Cruz MD [Primary Care Provider] - 


Forms:  ED Department Discharge





Sepsis Event Note (ED)





- Evaluation


Sepsis Screening Result: No Definite Risk





- Focused Exam


Vital Signs: 


                                   Vital Signs











  Temp Pulse Resp BP Pulse Ox


 


 05/19/21 23:27   101 H  16  123/96 H  93 L


 


 05/19/21 22:40  36.7 C  105 H  16  123/93 H  93 L


 


 05/19/21 21:20  36.6 C  101 H  16  116/85  95


 


 05/19/21 21:13  36.6 C  101 H  16  116/85  95














- Problem List & Annotations


(1) Surgical wound dehiscence


SNOMED Code(s): 96130726


   Code(s): T81.31XA - DISRUPTION OF EXTERNAL OPERATION (SURGICAL) WOUND, NEC, 

INIT   Status: Acute   Priority: High   Current Visit: Yes   


Qualifiers: 


   Encounter type: initial encounter   Qualified Code(s): T81.31XA - Disruption 

of external operation (surgical) wound, not elsewhere classified, initial 

encounter   





- Problem List Review


Problem List Initiated/Reviewed/Updated: Yes





- My Orders


Last 24 Hours: 


My Active Orders





05/19/21 22:08


Sodium Chloride 0.9% [Saline Flush]   10 ml FLUSH ASDIRECTED PRN 


Saline Lock Insert [OM.PC] Routine 





05/19/21 23:28


ceFAZolin [Ancef] 1 gm   Sodium Chloride 0.9% [Normal Saline] 50 ml IV ONETIME 














- Assessment/Plan


Last 24 Hours: 


My Active Orders





05/19/21 22:08


Sodium Chloride 0.9% [Saline Flush]   10 ml FLUSH ASDIRECTED PRN 


Saline Lock Insert [OM.PC] Routine 





05/19/21 23:28


ceFAZolin [Ancef] 1 gm   Sodium Chloride 0.9% [Normal Saline] 50 ml IV ONETIME